# Patient Record
Sex: MALE | NOT HISPANIC OR LATINO | Employment: OTHER | URBAN - METROPOLITAN AREA
[De-identification: names, ages, dates, MRNs, and addresses within clinical notes are randomized per-mention and may not be internally consistent; named-entity substitution may affect disease eponyms.]

---

## 2018-01-31 ENCOUNTER — PREPPED CHART (OUTPATIENT)
Dept: URBAN - METROPOLITAN AREA CLINIC 48 | Facility: CLINIC | Age: 58
End: 2018-01-31

## 2020-09-11 ENCOUNTER — ESTABLISHED COMPREHENSIVE EXAM (OUTPATIENT)
Dept: URBAN - METROPOLITAN AREA CLINIC 48 | Facility: CLINIC | Age: 60
End: 2020-09-11

## 2020-09-11 DIAGNOSIS — E11.9: ICD-10-CM

## 2020-09-11 DIAGNOSIS — Z97.3: ICD-10-CM

## 2020-09-11 DIAGNOSIS — H52.13: ICD-10-CM

## 2020-09-11 PROCEDURE — 92310 CONTACT LENS FITTING OU: CPT

## 2020-09-11 PROCEDURE — 92015 DETERMINE REFRACTIVE STATE: CPT

## 2020-09-11 PROCEDURE — 92250 FUNDUS PHOTOGRAPHY W/I&R: CPT

## 2020-09-11 PROCEDURE — 92014 COMPRE OPH EXAM EST PT 1/>: CPT

## 2020-09-11 ASSESSMENT — TONOMETRY
OS_IOP_MMHG: 15
OD_IOP_MMHG: 16

## 2020-09-11 ASSESSMENT — VISUAL ACUITY
OS_CC: 20/30
OD_CC: 20/30-2
OS_CC: 20/20
OD_CC: 20/25

## 2021-09-14 ENCOUNTER — ESTABLISHED COMPREHENSIVE EXAM (OUTPATIENT)
Dept: URBAN - METROPOLITAN AREA CLINIC 48 | Facility: CLINIC | Age: 61
End: 2021-09-14

## 2021-09-14 DIAGNOSIS — Z97.3: ICD-10-CM

## 2021-09-14 DIAGNOSIS — H52.13: ICD-10-CM

## 2021-09-14 DIAGNOSIS — E11.9: ICD-10-CM

## 2021-09-14 PROCEDURE — MISCOPTOS MISCELLANEOUS, OPTOS

## 2021-09-14 PROCEDURE — 99214 OFFICE O/P EST MOD 30 MIN: CPT

## 2021-09-14 PROCEDURE — 92015 DETERMINE REFRACTIVE STATE: CPT

## 2021-09-14 PROCEDURE — 92310 CONTACT LENS FITTING OU: CPT

## 2021-09-14 ASSESSMENT — VISUAL ACUITY
OS_CC: 20/50
OD_CC: 20/30-2
OD_CC: 20/20
OS_CC: 20/25

## 2021-09-14 ASSESSMENT — TONOMETRY
OS_IOP_MMHG: 12
OD_IOP_MMHG: 15

## 2022-09-15 ENCOUNTER — ESTABLISHED COMPREHENSIVE EXAM (OUTPATIENT)
Dept: URBAN - METROPOLITAN AREA CLINIC 48 | Facility: CLINIC | Age: 62
End: 2022-09-15

## 2022-09-15 DIAGNOSIS — E11.9: ICD-10-CM

## 2022-09-15 DIAGNOSIS — Z97.3: ICD-10-CM

## 2022-09-15 DIAGNOSIS — H52.13: ICD-10-CM

## 2022-09-15 PROCEDURE — 92015 DETERMINE REFRACTIVE STATE: CPT

## 2022-09-15 PROCEDURE — 99214 OFFICE O/P EST MOD 30 MIN: CPT

## 2022-09-15 PROCEDURE — 92310 CONTACT LENS FITTING OU: CPT

## 2022-09-15 PROCEDURE — MISCOPTOS MISCELLANEOUS, OPTOS

## 2022-09-15 ASSESSMENT — VISUAL ACUITY
OS_CC: 20/20-1
OS_CC: J5
OD_CC: J1
OD_CC: 20/25

## 2022-09-15 ASSESSMENT — TONOMETRY
OS_IOP_MMHG: 14
OD_IOP_MMHG: 15

## 2023-09-22 ENCOUNTER — ESTABLISHED COMPREHENSIVE EXAM (OUTPATIENT)
Dept: URBAN - METROPOLITAN AREA CLINIC 56 | Facility: CLINIC | Age: 63
End: 2023-09-22

## 2023-09-22 DIAGNOSIS — H52.13: ICD-10-CM

## 2023-09-22 DIAGNOSIS — E11.9: ICD-10-CM

## 2023-09-22 DIAGNOSIS — Z97.3: ICD-10-CM

## 2023-09-22 PROCEDURE — 99214 OFFICE O/P EST MOD 30 MIN: CPT

## 2023-09-22 PROCEDURE — 92310 CONTACT LENS FITTING OU: CPT

## 2023-09-22 PROCEDURE — 92015 DETERMINE REFRACTIVE STATE: CPT

## 2023-09-22 PROCEDURE — MISCOPTOS MISCELLANEOUS, OPTOS

## 2023-09-22 ASSESSMENT — VISUAL ACUITY
OS_CC: 20/25-2
OU_CC: 20/20
OD_CC: 20/30
OS_CC: 20/100
OD_CC: 20/30-2
OU_CC: 20/30

## 2023-09-22 ASSESSMENT — TONOMETRY
OS_IOP_MMHG: 15
OD_IOP_MMHG: 16

## 2024-02-21 ENCOUNTER — CONSULTATION (OUTPATIENT)
Dept: URBAN - METROPOLITAN AREA CLINIC 48 | Facility: CLINIC | Age: 64
End: 2024-02-21

## 2024-02-21 DIAGNOSIS — H01.026: ICD-10-CM

## 2024-02-21 DIAGNOSIS — H01.023: ICD-10-CM

## 2024-02-21 DIAGNOSIS — D23.112: ICD-10-CM

## 2024-02-21 PROCEDURE — 99213 OFFICE O/P EST LOW 20 MIN: CPT

## 2024-02-21 ASSESSMENT — VISUAL ACUITY
OD_SC: 20/20-1
OS_SC: 20/20-1

## 2024-02-21 ASSESSMENT — TONOMETRY
OD_IOP_MMHG: 13
OS_IOP_MMHG: 12

## 2024-07-25 ENCOUNTER — APPOINTMENT (OUTPATIENT)
Dept: LAB | Facility: HOSPITAL | Age: 64
End: 2024-07-25
Attending: UROLOGY
Payer: COMMERCIAL

## 2024-07-25 ENCOUNTER — TRANSCRIBE ORDERS (OUTPATIENT)
Dept: PREADMISSION TESTING | Facility: HOSPITAL | Age: 64
End: 2024-07-25

## 2024-07-25 DIAGNOSIS — C61 MALIGNANT NEOPLASM OF PROSTATE (CMS/HCC): ICD-10-CM

## 2024-07-25 DIAGNOSIS — Z01.812 ENCOUNTER FOR PREPROCEDURAL LABORATORY EXAMINATION: ICD-10-CM

## 2024-07-25 DIAGNOSIS — Z01.812 ENCOUNTER FOR PREPROCEDURAL LABORATORY EXAMINATION: Primary | ICD-10-CM

## 2024-07-25 LAB
ALBUMIN SERPL-MCNC: 4.5 G/DL (ref 3.5–5.7)
ALP SERPL-CCNC: 43 IU/L (ref 34–125)
ALT SERPL-CCNC: 20 IU/L (ref 7–52)
ANION GAP SERPL CALC-SCNC: 9 MEQ/L (ref 3–15)
AST SERPL-CCNC: 20 IU/L (ref 13–39)
BASOPHILS # BLD: 0.05 K/UL (ref 0.01–0.1)
BASOPHILS NFR BLD: 0.6 %
BILIRUB SERPL-MCNC: 0.7 MG/DL (ref 0.3–1.2)
BLOOD BANK CMNT PATIENT-IMP: NORMAL
BUN SERPL-MCNC: 17 MG/DL (ref 7–25)
CALCIUM SERPL-MCNC: 10.1 MG/DL (ref 8.6–10.3)
CHLORIDE SERPL-SCNC: 98 MEQ/L (ref 98–107)
CO2 SERPL-SCNC: 32 MEQ/L (ref 21–31)
CREAT SERPL-MCNC: 1.1 MG/DL (ref 0.7–1.3)
DIFFERENTIAL METHOD BLD: NORMAL
EGFRCR SERPLBLD CKD-EPI 2021: >60 ML/MIN/1.73M*2
EOSINOPHIL # BLD: 0.22 K/UL (ref 0.04–0.54)
EOSINOPHIL NFR BLD: 2.8 %
ERYTHROCYTE [DISTWIDTH] IN BLOOD BY AUTOMATED COUNT: 11.8 % (ref 11.6–14.4)
GLUCOSE SERPL-MCNC: 131 MG/DL (ref 70–99)
HCT VFR BLD AUTO: 41.4 % (ref 40.1–51)
HGB BLD-MCNC: 14.2 G/DL (ref 13.7–17.5)
IMM GRANULOCYTES # BLD AUTO: 0.01 K/UL (ref 0–0.08)
IMM GRANULOCYTES NFR BLD AUTO: 0.1 %
LYMPHOCYTES # BLD: 2.6 K/UL (ref 1.2–3.5)
LYMPHOCYTES NFR BLD: 32.7 %
MCH RBC QN AUTO: 29.6 PG (ref 28–33.2)
MCHC RBC AUTO-ENTMCNC: 34.3 G/DL (ref 32.2–36.5)
MCV RBC AUTO: 86.4 FL (ref 83–98)
MONOCYTES # BLD: 0.61 K/UL (ref 0.3–1)
MONOCYTES NFR BLD: 7.7 %
NEUTROPHILS # BLD: 4.46 K/UL (ref 1.7–7)
NEUTS SEG NFR BLD: 56.1 %
NRBC BLD-RTO: 0 %
PDW BLD AUTO: 9.8 FL (ref 9.4–12.4)
PLATELET # BLD AUTO: 251 K/UL (ref 150–350)
POTASSIUM SERPL-SCNC: 3.6 MEQ/L (ref 3.5–5.1)
PROT SERPL-MCNC: 7.2 G/DL (ref 6–8.2)
RBC # BLD AUTO: 4.79 M/UL (ref 4.5–5.8)
SODIUM SERPL-SCNC: 139 MEQ/L (ref 136–145)
WBC # BLD AUTO: 7.95 K/UL (ref 3.8–10.5)

## 2024-07-25 PROCEDURE — 86900 BLOOD TYPING SEROLOGIC ABO: CPT

## 2024-07-25 PROCEDURE — 85025 COMPLETE CBC W/AUTO DIFF WBC: CPT

## 2024-07-25 PROCEDURE — 36415 COLL VENOUS BLD VENIPUNCTURE: CPT

## 2024-07-25 PROCEDURE — 86850 RBC ANTIBODY SCREEN: CPT

## 2024-07-25 PROCEDURE — 80053 COMPREHEN METABOLIC PANEL: CPT

## 2024-08-01 ENCOUNTER — PRE-ADMISSION TESTING (OUTPATIENT)
Dept: PREADMISSION TESTING | Age: 64
End: 2024-08-01
Payer: COMMERCIAL

## 2024-08-01 VITALS — BODY MASS INDEX: 32.62 KG/M2 | WEIGHT: 233 LBS | HEIGHT: 71 IN

## 2024-08-01 RX ORDER — AMLODIPINE BESYLATE 10 MG/1
10 TABLET ORAL DAILY
COMMUNITY
Start: 2024-06-12

## 2024-08-01 RX ORDER — EZETIMIBE 10 MG/1
10 TABLET ORAL EVERY EVENING
COMMUNITY

## 2024-08-01 RX ORDER — ORAL SEMAGLUTIDE 14 MG/1
14 TABLET ORAL
COMMUNITY
Start: 2024-01-01

## 2024-08-01 RX ORDER — UBIDECARENONE 100 MG
100 CAPSULE ORAL DAILY
COMMUNITY

## 2024-08-01 RX ORDER — METFORMIN HYDROCHLORIDE 500 MG/1
500 TABLET, EXTENDED RELEASE ORAL 2 TIMES DAILY WITH MEALS
COMMUNITY
Start: 2024-06-12

## 2024-08-01 RX ORDER — CHLORTHALIDONE 25 MG/1
25 TABLET ORAL DAILY
COMMUNITY

## 2024-08-01 RX ORDER — CHOLECALCIFEROL (VITAMIN D3) 25 MCG
25 TABLET ORAL DAILY
COMMUNITY

## 2024-08-01 RX ORDER — ROSUVASTATIN CALCIUM 5 MG/1
5 TABLET, COATED ORAL EVERY EVENING
COMMUNITY

## 2024-08-01 RX ORDER — VALSARTAN 160 MG/1
160 TABLET ORAL DAILY
COMMUNITY
Start: 2024-06-12

## 2024-08-01 NOTE — PRE-PROCEDURE INSTRUCTIONS
[facility]  [address] [place] [address]    1.       Admissions will call you with your arrival time on  August 12, 2024 (day prior to surgery) between 2pm-4pm.  For questions about your arrival time, please call [phone].    2.       On the day of your procedure please report to the [facility] in the [place]. Please arrive through the [place] [facility] Entrance.  If you are parking in the [facility], come to the ground floor of the garage and follow signs to the [facility].  After being screened, please report to either the Outpatient Registration for Pre-Admission Testing or to the Surgery Registration Desk.    3. Please follow the following fasting guidelines:     Follow Dr's instructions-clear liquid diet the day before the procedure  NPO after midnight-can have 6oz of water 2 hours before arrival time    4. Please take ONLY the following medications with a sip of water on the morning of your procedure:    Amlodipine with small sip of water  STOP supplements, NSAIDS one week before surgery  PER Family Rocky Saunders a week before surgery    5. Other Instructions: You may brush your teeth the morning of the procedure. Rinse and spit, do not swallow.  Bring a list of your medications with dosages.  Use surgical wash as directed.     6. If you develop a cold, cough, fever, rash, or other symptom prior to the day of the procedure, please report it to your physician immediately.    7. If you need to cancel the procedure for any reason, please contact your physician.    8. Make arrangements to have safe transportation home accompanied by a responsible adult. If you have not arranged safe transportation home, your surgery will be cancelled. Safe transportation may include private vehicle, ride-share service, taxi and public transportation when accompanied by a responsible adult who will assist you home. A responsible adult is someone known to you and does not include  the taxi, ride-share or public transit drive transporting you.    9.  If it is medically necessary for you to have a longer stay, you will be informed as soon as the decision is made.    10. Only bring essential items to the hospital.  Do not wear or bring anything of value to the hospital including jewelry of any kind, money, or wallet. Do not wear make-up or contact lenses.  DO NOT BRING MEDICATIONS FROM HOME unless instructed to do so. DO bring your hearing aids, glasses, and a case    11. No lotion, creams, powders, or oils on skin the morning of procedure     12. Dress in comfortable clothes.    13.  If instructed, please bring a copy of your Advanced Directive (Living Will/Durable Power of ) on the day of your procedure.     14. Ensuring your safety at all times is a very important part of our [facility] Culture of Safety. After having surgery and sedation, you are at risk for falling and balance issues. Although you may feel awake, the effects of the medication can last up to 24 hours after anesthesia. If you need to use the bathroom during your recovery period, nursing staff will escort you there and stay with you to ensure your safety.    15. Refrain from drinking alcohol and smoking cigarettes for 24 hours prior to surgery.    16. Shower with antibacterial soap (Dial) the night before and morning of your procedure.  If your procedure indicates the need for CHG antiseptic wash (Bactoshield or Hibiclens), please use this instead and follow instructions as discussed at the time of your Pre-Admission Testing phone interview or visit.    [facility]  Patient Education Preoperative Showers    Good hygiene, such as frequent handwashing and daily skin cleansing, promotes good health. Daily skin cleansing helps remove germs that may cause infections. The following instructions should be followed to help reduce germs on your skin prior to your surgical procedure.     Bactoshield/Hibiclens CHG 4% is an  antiseptic soap. The active ingredient is chlorhexidine gluconate. Do not use this product if you are allergic to chlorhexidine gluconate.  The NIGHT before and the MORNING of your procedure, shower or bathe with Bactoshield. This product should replace your regular soap used for cleansing most of your body surfaces. Bactoshield should not be used on your head or face; keep out of your eyes, ears and mouth.  If you plan to wash your hair, do so with your regular shampoo. Then, rinse the hair and your body thoroughly to remove any shampoo residue.  Wash your face with regular soap and water only.  Wash your genital area with soap and water only.  Thoroughly rinse your body with warm water from the neck down.  Apply the minimum amount of Bactoshield to cover the skin. Use this product as you would any liquid soap. Leave this on for 2 minutes. NOTE- Bactoshield DOES NOT LATHER like normal soap.   Wash the skin gently and rinse thoroughly with warm water. You do not need to scrub the skin to remove germs.  Do not use regular soap after you have applied and rinsed the Bactoshield.  Change into clean clothes after each shower.   Do not apply any lotions, powders, or perfumes to the body areas that have been cleansed with Bactoshield.  No use of hair removal products or shaving at or near the surgical site 48 hours before your procedure. (72 hours for cardiac patients.)  For those having perineal surgeries (i.e.: vaginal, rectal or cystoscopy) - please use Dial soap    Above instructions reviewed with patient and patient acknowledges understanding.    Form explained by: Rafaela Forbes RN

## 2024-08-12 ENCOUNTER — ANESTHESIA EVENT (OUTPATIENT)
Dept: OPERATING ROOM | Facility: HOSPITAL | Age: 64
Setting detail: SURGERY ADMIT
DRG: 708 | End: 2024-08-12
Payer: COMMERCIAL

## 2024-08-12 LAB
ABO + RH BLD: NORMAL
BLD GP AB SCN SERPL QL: NEGATIVE
D AG BLD QL: POSITIVE
LABORATORY COMMENT REPORT: NORMAL
LABORATORY COMMENT REPORT: NORMAL
SPECIMEN EXP DATE BLD: NORMAL

## 2024-08-13 ENCOUNTER — HOSPITAL ENCOUNTER (INPATIENT)
Facility: HOSPITAL | Age: 64
LOS: 1 days | Discharge: HOME | DRG: 708 | End: 2024-08-14
Attending: UROLOGY | Admitting: UROLOGY
Payer: COMMERCIAL

## 2024-08-13 ENCOUNTER — ANESTHESIA (OUTPATIENT)
Dept: OPERATING ROOM | Facility: HOSPITAL | Age: 64
Setting detail: SURGERY ADMIT
DRG: 708 | End: 2024-08-13
Payer: COMMERCIAL

## 2024-08-13 DIAGNOSIS — C61 PROSTATE CA (CMS/HCC): ICD-10-CM

## 2024-08-13 LAB
ABO + RH BLD: NORMAL
D AG BLD QL: POSITIVE
GLUCOSE BLD-MCNC: 122 MG/DL (ref 70–99)
GLUCOSE BLD-MCNC: 139 MG/DL (ref 70–99)
GLUCOSE BLD-MCNC: 140 MG/DL (ref 70–99)
GLUCOSE BLD-MCNC: 188 MG/DL (ref 70–99)
LABORATORY COMMENT REPORT: NORMAL
POCT TEST: ABNORMAL

## 2024-08-13 PROCEDURE — 63700000 HC SELF-ADMINISTRABLE DRUG: Performed by: UROLOGY

## 2024-08-13 PROCEDURE — 37000001 HC ANESTHESIA GENERAL: Performed by: UROLOGY

## 2024-08-13 PROCEDURE — 0VT04ZZ RESECTION OF PROSTATE, PERCUTANEOUS ENDOSCOPIC APPROACH: ICD-10-PCS | Performed by: UROLOGY

## 2024-08-13 PROCEDURE — 25000000 HC PHARMACY GENERAL: Performed by: NURSE ANESTHETIST, CERTIFIED REGISTERED

## 2024-08-13 PROCEDURE — 88309 TISSUE EXAM BY PATHOLOGIST: CPT | Performed by: UROLOGY

## 2024-08-13 PROCEDURE — 63600000 HC DRUGS/DETAIL CODE: Mod: JZ

## 2024-08-13 PROCEDURE — 88307 TISSUE EXAM BY PATHOLOGIST: CPT | Performed by: UROLOGY

## 2024-08-13 PROCEDURE — 63600000 HC DRUGS/DETAIL CODE: Mod: JZ | Performed by: NURSE ANESTHETIST, CERTIFIED REGISTERED

## 2024-08-13 PROCEDURE — 12000000 HC ROOM AND CARE MED/SURG

## 2024-08-13 PROCEDURE — 63600000 HC DRUGS/DETAIL CODE: Performed by: NURSE ANESTHETIST, CERTIFIED REGISTERED

## 2024-08-13 PROCEDURE — 36415 COLL VENOUS BLD VENIPUNCTURE: CPT | Performed by: UROLOGY

## 2024-08-13 PROCEDURE — 71000001 HC PACU PHASE 1 INITIAL 30MIN: Performed by: UROLOGY

## 2024-08-13 PROCEDURE — C1894 INTRO/SHEATH, NON-LASER: HCPCS | Performed by: UROLOGY

## 2024-08-13 PROCEDURE — 8E0W4CZ ROBOTIC ASSISTED PROCEDURE OF TRUNK REGION, PERCUTANEOUS ENDOSCOPIC APPROACH: ICD-10-PCS | Performed by: UROLOGY

## 2024-08-13 PROCEDURE — 63600000 HC DRUGS/DETAIL CODE: Mod: JZ,JG | Performed by: UROLOGY

## 2024-08-13 PROCEDURE — 0VT34ZZ RESECTION OF BILATERAL SEMINAL VESICLES, PERCUTANEOUS ENDOSCOPIC APPROACH: ICD-10-PCS | Performed by: UROLOGY

## 2024-08-13 PROCEDURE — 07BC4ZX EXCISION OF PELVIS LYMPHATIC, PERCUTANEOUS ENDOSCOPIC APPROACH, DIAGNOSTIC: ICD-10-PCS | Performed by: UROLOGY

## 2024-08-13 PROCEDURE — 25800000 HC PHARMACY IV SOLUTIONS: Performed by: NURSE ANESTHETIST, CERTIFIED REGISTERED

## 2024-08-13 PROCEDURE — 0VTQ4ZZ RESECTION OF BILATERAL VAS DEFERENS, PERCUTANEOUS ENDOSCOPIC APPROACH: ICD-10-PCS | Performed by: UROLOGY

## 2024-08-13 PROCEDURE — 36000006 HC OR LEVEL 6 INITIAL 30MIN: Performed by: UROLOGY

## 2024-08-13 PROCEDURE — 27200000 HC STERILE SUPPLY: Performed by: UROLOGY

## 2024-08-13 PROCEDURE — 63600000 HC DRUGS/DETAIL CODE: Mod: JZ | Performed by: UROLOGY

## 2024-08-13 PROCEDURE — 71000011 HC PACU PHASE 1 EA ADDL MIN: Performed by: UROLOGY

## 2024-08-13 PROCEDURE — 36000016 HC OR LEVEL 6 EA ADDL MIN: Performed by: UROLOGY

## 2024-08-13 PROCEDURE — 25800000 HC PHARMACY IV SOLUTIONS: Performed by: UROLOGY

## 2024-08-13 RX ORDER — DEXTROSE 50 % IN WATER (D50W) INTRAVENOUS SYRINGE
25 AS NEEDED
Status: DISCONTINUED | OUTPATIENT
Start: 2024-08-13 | End: 2024-08-13 | Stop reason: HOSPADM

## 2024-08-13 RX ORDER — CHLORTHALIDONE 25 MG/1
25 TABLET ORAL DAILY
Status: DISCONTINUED | OUTPATIENT
Start: 2024-08-13 | End: 2024-08-14 | Stop reason: HOSPADM

## 2024-08-13 RX ORDER — SODIUM CHLORIDE, SODIUM GLUCONATE, SODIUM ACETATE, POTASSIUM CHLORIDE AND MAGNESIUM CHLORIDE 30; 37; 368; 526; 502 MG/100ML; MG/100ML; MG/100ML; MG/100ML; MG/100ML
INJECTION, SOLUTION INTRAVENOUS CONTINUOUS PRN
Status: DISCONTINUED | OUTPATIENT
Start: 2024-08-13 | End: 2024-08-13 | Stop reason: SURG

## 2024-08-13 RX ORDER — CIPROFLOXACIN 500 MG/1
500 TABLET ORAL DAILY
Status: DISCONTINUED | OUTPATIENT
Start: 2024-08-14 | End: 2024-08-14 | Stop reason: HOSPADM

## 2024-08-13 RX ORDER — KETOROLAC TROMETHAMINE 30 MG/ML
INJECTION, SOLUTION INTRAMUSCULAR; INTRAVENOUS AS NEEDED
Status: DISCONTINUED | OUTPATIENT
Start: 2024-08-13 | End: 2024-08-13 | Stop reason: SURG

## 2024-08-13 RX ORDER — ONDANSETRON HYDROCHLORIDE 2 MG/ML
4 INJECTION, SOLUTION INTRAVENOUS EVERY 8 HOURS PRN
Status: DISCONTINUED | OUTPATIENT
Start: 2024-08-13 | End: 2024-08-14 | Stop reason: HOSPADM

## 2024-08-13 RX ORDER — HYDROMORPHONE HYDROCHLORIDE 1 MG/ML
0.25 INJECTION, SOLUTION INTRAMUSCULAR; INTRAVENOUS; SUBCUTANEOUS EVERY 4 HOURS PRN
Status: DISCONTINUED | OUTPATIENT
Start: 2024-08-13 | End: 2024-08-14 | Stop reason: HOSPADM

## 2024-08-13 RX ORDER — SODIUM CHLORIDE 9 MG/ML
INJECTION, SOLUTION INTRAVENOUS CONTINUOUS
Status: DISCONTINUED | OUTPATIENT
Start: 2024-08-13 | End: 2024-08-14 | Stop reason: HOSPADM

## 2024-08-13 RX ORDER — LABETALOL HCL 20 MG/4 ML
5 SYRINGE (ML) INTRAVENOUS AS NEEDED
Status: DISCONTINUED | OUTPATIENT
Start: 2024-08-13 | End: 2024-08-13 | Stop reason: HOSPADM

## 2024-08-13 RX ORDER — DEXTROSE 40 %
15-30 GEL (GRAM) ORAL AS NEEDED
Status: DISCONTINUED | OUTPATIENT
Start: 2024-08-13 | End: 2024-08-13 | Stop reason: HOSPADM

## 2024-08-13 RX ORDER — INSULIN LISPRO 100 [IU]/ML
6-10 INJECTION, SOLUTION INTRAVENOUS; SUBCUTANEOUS
Status: DISCONTINUED | OUTPATIENT
Start: 2024-08-13 | End: 2024-08-14 | Stop reason: HOSPADM

## 2024-08-13 RX ORDER — FENTANYL CITRATE 50 UG/ML
50 INJECTION, SOLUTION INTRAMUSCULAR; INTRAVENOUS EVERY 5 MIN PRN
Status: DISCONTINUED | OUTPATIENT
Start: 2024-08-13 | End: 2024-08-13 | Stop reason: HOSPADM

## 2024-08-13 RX ORDER — DEXTROSE 50 % IN WATER (D50W) INTRAVENOUS SYRINGE
25 AS NEEDED
Status: DISCONTINUED | OUTPATIENT
Start: 2024-08-13 | End: 2024-08-13

## 2024-08-13 RX ORDER — IBUPROFEN 200 MG
16-32 TABLET ORAL AS NEEDED
Status: DISCONTINUED | OUTPATIENT
Start: 2024-08-13 | End: 2024-08-14 | Stop reason: HOSPADM

## 2024-08-13 RX ORDER — ROSUVASTATIN CALCIUM 5 MG/1
5 TABLET, COATED ORAL EVERY EVENING
Status: DISCONTINUED | OUTPATIENT
Start: 2024-08-13 | End: 2024-08-14 | Stop reason: HOSPADM

## 2024-08-13 RX ORDER — DEXTROSE 40 %
15-30 GEL (GRAM) ORAL AS NEEDED
Status: DISCONTINUED | OUTPATIENT
Start: 2024-08-13 | End: 2024-08-13

## 2024-08-13 RX ORDER — IBUPROFEN 200 MG
16-32 TABLET ORAL AS NEEDED
Status: DISCONTINUED | OUTPATIENT
Start: 2024-08-13 | End: 2024-08-13 | Stop reason: HOSPADM

## 2024-08-13 RX ORDER — LIDOCAINE HYDROCHLORIDE 10 MG/ML
INJECTION, SOLUTION INFILTRATION; PERINEURAL AS NEEDED
Status: DISCONTINUED | OUTPATIENT
Start: 2024-08-13 | End: 2024-08-13 | Stop reason: SURG

## 2024-08-13 RX ORDER — FENTANYL CITRATE 50 UG/ML
INJECTION, SOLUTION INTRAMUSCULAR; INTRAVENOUS AS NEEDED
Status: DISCONTINUED | OUTPATIENT
Start: 2024-08-13 | End: 2024-08-13 | Stop reason: SURG

## 2024-08-13 RX ORDER — MIDAZOLAM HYDROCHLORIDE 2 MG/2ML
INJECTION, SOLUTION INTRAMUSCULAR; INTRAVENOUS AS NEEDED
Status: DISCONTINUED | OUTPATIENT
Start: 2024-08-13 | End: 2024-08-13 | Stop reason: SURG

## 2024-08-13 RX ORDER — AMLODIPINE BESYLATE 10 MG/1
10 TABLET ORAL DAILY
Status: DISCONTINUED | OUTPATIENT
Start: 2024-08-14 | End: 2024-08-14 | Stop reason: HOSPADM

## 2024-08-13 RX ORDER — ENOXAPARIN SODIUM 100 MG/ML
40 INJECTION SUBCUTANEOUS
Status: COMPLETED | OUTPATIENT
Start: 2024-08-13 | End: 2024-08-13

## 2024-08-13 RX ORDER — ONDANSETRON HYDROCHLORIDE 2 MG/ML
4 INJECTION, SOLUTION INTRAVENOUS
Status: DISCONTINUED | OUTPATIENT
Start: 2024-08-13 | End: 2024-08-13 | Stop reason: HOSPADM

## 2024-08-13 RX ORDER — EZETIMIBE 10 MG/1
10 TABLET ORAL EVERY EVENING
Status: DISCONTINUED | OUTPATIENT
Start: 2024-08-13 | End: 2024-08-14 | Stop reason: HOSPADM

## 2024-08-13 RX ORDER — ENOXAPARIN SODIUM 100 MG/ML
INJECTION SUBCUTANEOUS
Status: COMPLETED
Start: 2024-08-13 | End: 2024-08-13

## 2024-08-13 RX ORDER — HYDROMORPHONE HYDROCHLORIDE 1 MG/ML
0.5 INJECTION, SOLUTION INTRAMUSCULAR; INTRAVENOUS; SUBCUTANEOUS
Status: DISCONTINUED | OUTPATIENT
Start: 2024-08-13 | End: 2024-08-13 | Stop reason: HOSPADM

## 2024-08-13 RX ORDER — ROCURONIUM BROMIDE 10 MG/ML
INJECTION, SOLUTION INTRAVENOUS AS NEEDED
Status: DISCONTINUED | OUTPATIENT
Start: 2024-08-13 | End: 2024-08-13 | Stop reason: SURG

## 2024-08-13 RX ORDER — ONDANSETRON HYDROCHLORIDE 2 MG/ML
INJECTION, SOLUTION INTRAVENOUS AS NEEDED
Status: DISCONTINUED | OUTPATIENT
Start: 2024-08-13 | End: 2024-08-13 | Stop reason: SURG

## 2024-08-13 RX ORDER — POLYETHYLENE GLYCOL 3350 17 G/17G
17 POWDER, FOR SOLUTION ORAL DAILY
Status: DISCONTINUED | OUTPATIENT
Start: 2024-08-13 | End: 2024-08-14 | Stop reason: HOSPADM

## 2024-08-13 RX ORDER — HYDROMORPHONE HYDROCHLORIDE 1 MG/ML
INJECTION, SOLUTION INTRAMUSCULAR; INTRAVENOUS; SUBCUTANEOUS AS NEEDED
Status: DISCONTINUED | OUTPATIENT
Start: 2024-08-13 | End: 2024-08-13 | Stop reason: SURG

## 2024-08-13 RX ORDER — ACETAMINOPHEN 325 MG/1
650 TABLET ORAL EVERY 4 HOURS
Status: DISCONTINUED | OUTPATIENT
Start: 2024-08-13 | End: 2024-08-14 | Stop reason: HOSPADM

## 2024-08-13 RX ORDER — BUPIVACAINE HYDROCHLORIDE 5 MG/ML
INJECTION, SOLUTION EPIDURAL; INTRACAUDAL
Status: DISCONTINUED | OUTPATIENT
Start: 2024-08-13 | End: 2024-08-13 | Stop reason: HOSPADM

## 2024-08-13 RX ORDER — DEXTROSE 50 % IN WATER (D50W) INTRAVENOUS SYRINGE
25 AS NEEDED
Status: DISCONTINUED | OUTPATIENT
Start: 2024-08-13 | End: 2024-08-14 | Stop reason: HOSPADM

## 2024-08-13 RX ORDER — DEXAMETHASONE SODIUM PHOSPHATE 4 MG/ML
INJECTION, SOLUTION INTRA-ARTICULAR; INTRALESIONAL; INTRAMUSCULAR; INTRAVENOUS; SOFT TISSUE AS NEEDED
Status: DISCONTINUED | OUTPATIENT
Start: 2024-08-13 | End: 2024-08-13 | Stop reason: SURG

## 2024-08-13 RX ORDER — SODIUM CHLORIDE 9 MG/ML
INJECTION, SOLUTION INTRAVENOUS CONTINUOUS PRN
Status: DISCONTINUED | OUTPATIENT
Start: 2024-08-13 | End: 2024-08-13 | Stop reason: SURG

## 2024-08-13 RX ORDER — PROPOFOL 10 MG/ML
INJECTION, EMULSION INTRAVENOUS AS NEEDED
Status: DISCONTINUED | OUTPATIENT
Start: 2024-08-13 | End: 2024-08-13 | Stop reason: SURG

## 2024-08-13 RX ORDER — OXYCODONE HYDROCHLORIDE 5 MG/1
5 TABLET ORAL EVERY 4 HOURS PRN
Status: DISCONTINUED | OUTPATIENT
Start: 2024-08-13 | End: 2024-08-14 | Stop reason: HOSPADM

## 2024-08-13 RX ORDER — HEPARIN SODIUM 5000 [USP'U]/ML
5000 INJECTION, SOLUTION INTRAVENOUS; SUBCUTANEOUS EVERY 8 HOURS
Status: DISCONTINUED | OUTPATIENT
Start: 2024-08-14 | End: 2024-08-14 | Stop reason: HOSPADM

## 2024-08-13 RX ORDER — KETOROLAC TROMETHAMINE 15 MG/ML
15 INJECTION, SOLUTION INTRAMUSCULAR; INTRAVENOUS EVERY 6 HOURS
Status: DISCONTINUED | OUTPATIENT
Start: 2024-08-13 | End: 2024-08-13

## 2024-08-13 RX ORDER — DEXTROSE 40 %
15-30 GEL (GRAM) ORAL AS NEEDED
Status: DISCONTINUED | OUTPATIENT
Start: 2024-08-13 | End: 2024-08-14 | Stop reason: HOSPADM

## 2024-08-13 RX ORDER — IBUPROFEN 200 MG
16-32 TABLET ORAL AS NEEDED
Status: DISCONTINUED | OUTPATIENT
Start: 2024-08-13 | End: 2024-08-13

## 2024-08-13 RX ORDER — KETOROLAC TROMETHAMINE 15 MG/ML
15 INJECTION, SOLUTION INTRAMUSCULAR; INTRAVENOUS EVERY 6 HOURS
Status: DISCONTINUED | OUTPATIENT
Start: 2024-08-13 | End: 2024-08-14 | Stop reason: HOSPADM

## 2024-08-13 RX ADMIN — ACETAMINOPHEN 650 MG: 325 TABLET ORAL at 21:42

## 2024-08-13 RX ADMIN — PROPOFOL 10 MCG/KG/MIN: 10 INJECTION, EMULSION INTRAVENOUS at 07:42

## 2024-08-13 RX ADMIN — CEFAZOLIN 2 G: 2 INJECTION, POWDER, FOR SOLUTION INTRAMUSCULAR; INTRAVENOUS at 19:50

## 2024-08-13 RX ADMIN — CEFAZOLIN 2 G: 2 INJECTION, POWDER, FOR SOLUTION INTRAMUSCULAR; INTRAVENOUS at 07:36

## 2024-08-13 RX ADMIN — PROPOFOL 160 MG: 10 INJECTION, EMULSION INTRAVENOUS at 07:33

## 2024-08-13 RX ADMIN — OXYCODONE HYDROCHLORIDE 5 MG: 5 TABLET ORAL at 20:01

## 2024-08-13 RX ADMIN — CHLORTHALIDONE 25 MG: 25 TABLET ORAL at 14:59

## 2024-08-13 RX ADMIN — PROPOFOL 40 MG: 10 INJECTION, EMULSION INTRAVENOUS at 07:51

## 2024-08-13 RX ADMIN — ROCURONIUM BROMIDE 20 MG: 10 INJECTION, SOLUTION INTRAVENOUS at 10:54

## 2024-08-13 RX ADMIN — FENTANYL CITRATE 50 MCG: 50 INJECTION INTRAMUSCULAR; INTRAVENOUS at 09:52

## 2024-08-13 RX ADMIN — SODIUM CHLORIDE: 0.9 INJECTION, SOLUTION INTRAVENOUS at 07:25

## 2024-08-13 RX ADMIN — ROCURONIUM BROMIDE 30 MG: 10 INJECTION, SOLUTION INTRAVENOUS at 09:23

## 2024-08-13 RX ADMIN — EZETIMIBE 10 MG: 10 TABLET ORAL at 16:39

## 2024-08-13 RX ADMIN — SODIUM CHLORIDE, SODIUM GLUCONATE, SODIUM ACETATE, POTASSIUM CHLORIDE AND MAGNESIUM CHLORIDE: 526; 502; 368; 37; 30 INJECTION, SOLUTION INTRAVENOUS at 07:37

## 2024-08-13 RX ADMIN — SUGAMMADEX 200 MG: 100 INJECTION, SOLUTION INTRAVENOUS at 11:32

## 2024-08-13 RX ADMIN — CEFAZOLIN 2 G: 2 INJECTION, POWDER, FOR SOLUTION INTRAMUSCULAR; INTRAVENOUS at 11:36

## 2024-08-13 RX ADMIN — KETOROLAC TROMETHAMINE 15 MG: 15 INJECTION, SOLUTION INTRAMUSCULAR; INTRAVENOUS at 17:08

## 2024-08-13 RX ADMIN — SODIUM CHLORIDE: 9 INJECTION, SOLUTION INTRAVENOUS at 17:11

## 2024-08-13 RX ADMIN — HYDROMORPHONE HYDROCHLORIDE 0.5 MG: 1 INJECTION, SOLUTION INTRAMUSCULAR; INTRAVENOUS; SUBCUTANEOUS at 11:35

## 2024-08-13 RX ADMIN — SODIUM CHLORIDE: 9 INJECTION, SOLUTION INTRAVENOUS at 14:29

## 2024-08-13 RX ADMIN — SODIUM CHLORIDE, SODIUM GLUCONATE, SODIUM ACETATE, POTASSIUM CHLORIDE AND MAGNESIUM CHLORIDE: 526; 502; 368; 37; 30 INJECTION, SOLUTION INTRAVENOUS at 11:03

## 2024-08-13 RX ADMIN — LIDOCAINE HYDROCHLORIDE 10 ML: 10 INJECTION, SOLUTION INFILTRATION; PERINEURAL at 07:33

## 2024-08-13 RX ADMIN — ENOXAPARIN SODIUM 40 MG: 40 INJECTION SUBCUTANEOUS at 06:47

## 2024-08-13 RX ADMIN — ONDANSETRON 4 MG: 2 INJECTION INTRAMUSCULAR; INTRAVENOUS at 07:52

## 2024-08-13 RX ADMIN — MIDAZOLAM HYDROCHLORIDE 2 MG: 1 INJECTION, SOLUTION INTRAMUSCULAR; INTRAVENOUS at 07:23

## 2024-08-13 RX ADMIN — DEXAMETHASONE SODIUM PHOSPHATE 8 MG: 4 INJECTION, SOLUTION INTRA-ARTICULAR; INTRALESIONAL; INTRAMUSCULAR; INTRAVENOUS; SOFT TISSUE at 07:52

## 2024-08-13 RX ADMIN — KETOROLAC TROMETHAMINE 15 MG: 30 INJECTION, SOLUTION INTRAMUSCULAR at 11:33

## 2024-08-13 RX ADMIN — ENOXAPARIN SODIUM 40 MG: 100 INJECTION SUBCUTANEOUS at 06:47

## 2024-08-13 RX ADMIN — ROSUVASTATIN CALCIUM 5 MG: 5 TABLET, FILM COATED ORAL at 17:07

## 2024-08-13 RX ADMIN — FENTANYL CITRATE 100 MCG: 50 INJECTION INTRAMUSCULAR; INTRAVENOUS at 07:33

## 2024-08-13 RX ADMIN — ACETAMINOPHEN 650 MG: 325 TABLET ORAL at 16:40

## 2024-08-13 RX ADMIN — ROCURONIUM BROMIDE 100 MG: 10 INJECTION, SOLUTION INTRAVENOUS at 07:33

## 2024-08-13 RX ADMIN — FENTANYL CITRATE 50 MCG: 50 INJECTION INTRAMUSCULAR; INTRAVENOUS at 09:24

## 2024-08-13 ASSESSMENT — COGNITIVE AND FUNCTIONAL STATUS - GENERAL
WALKING IN HOSPITAL ROOM: 3 - A LITTLE
MOVING TO AND FROM BED TO CHAIR: 3 - A LITTLE
CLIMB 3 TO 5 STEPS WITH RAILING: 3 - A LITTLE
STANDING UP FROM CHAIR USING ARMS: 3 - A LITTLE
STANDING UP FROM CHAIR USING ARMS: 3 - A LITTLE
WALKING IN HOSPITAL ROOM: 3 - A LITTLE
CLIMB 3 TO 5 STEPS WITH RAILING: 3 - A LITTLE
MOVING TO AND FROM BED TO CHAIR: 3 - A LITTLE

## 2024-08-13 NOTE — ANESTHESIA PREPROCEDURE EVALUATION
Relevant Problems   No relevant active problems       ROS/Med Hx     Past Surgical History:   Procedure Laterality Date    COLONOSCOPY      FRACTURE SURGERY Left     left foream maggi in place    PROSTATE BIOPSY       There is no problem list on file for this patient.      No current facility-administered medications for this encounter.       Prior to Admission medications    Medication Sig Start Date End Date Taking? Authorizing Provider   amLODIPine (NORVASC) 10 mg tablet Take 10 mg by mouth daily. 6/12/24   Jordan Cotton MD   chlorthalidone (HYGROTON) 25 mg tablet Take 25 mg by mouth daily.    Jordan Cotton MD   cholecalciferol, vitamin D3, 1,000 unit (25 mcg) tablet Take 25 mcg by mouth daily.    Jordan Cotton MD   coenzyme Q10 (COQ10) 100 mg capsule Take 100 mg by mouth daily.    Jordan Cotton MD   ezetimibe (ZETIA) 10 mg tablet Take 10 mg by mouth every evening.    Jordan Cotton MD   FISH OIL-omega-3 fatty acids (FISH OIL) 340-1,000 mg capsule Take 1 capsule by mouth daily.    Jordan Cotton MD   metFORMIN XR (GLUCOPHAGE-XR) 500 mg 24 hr tablet Take 500 mg by mouth 2 (two) times a day with meals. 6/12/24   Jordan Cotton MD   rosuvastatin (CRESTOR) 5 mg tablet Take 5 mg by mouth every evening.    Jordan Cotton MD   RYBELSUS 14 mg tablet Take 14 mg by mouth daily before breakfast. 1/1/24   Jordan Cotton MD   valsartan (DIOVAN) 160 mg tablet Take 160 mg by mouth daily. 6/12/24   Jordan Cotton MD       CBC Results         07/25/24     1131    WBC 7.95    RBC 4.79    HGB 14.2    HCT 41.4    MCV 86.4    MCH 29.6    MCHC 34.3                BMP Results         07/25/24     1131        K 3.6    Cl 98    CO2 32    Glucose 131    BUN 17    Creatinine 1.1    Calcium 10.1    Anion Gap 9    EGFR >60.0           Comment for EGFR at 1131 on 07/25/24: Calculation based on the Chronic Kidney Disease  "Epidemiology Collaboration (CKD-EPI) equation refit without adjustment for race.            No results found for: \"HCGPREGUR\", \"PREGSERUM\", \"HCG\", \"HCGQUANT\"          No orders to display         Physical Exam    Airway   Mallampati: III  Cardiovascular - normal   Rhythm: regular   Rate: normalPulmonary - normal   clear to auscultation  Dental - normal        Anesthesia Plan    Plan: general    Technique: general endotracheal     Lines and Monitors: PIV     Airway: oral intubation    2 ASA  Anesthetic plan and risks discussed with: patient  Induction:    intravenous   Postop Plan:   Patient Disposition: inpatient floor planned admission   Pain Management: IV analgesics      "

## 2024-08-13 NOTE — OR SURGEON
Pre-Procedure patient identification:  I am the primary operating surgeon/proceduralist and I have reviewed the applicable pathology reports and radiology studies for this procedure. I have identified the patient on 08/13/24 at 7:19 AM Twin Singer MD  Phone Number: [phone]

## 2024-08-13 NOTE — OP NOTE
REPORT TYPE:  Operative Note     DATE OF OPERATION: 8/13/24      PREOPERATIVE DIAGNOSIS:  Prostate cancer.     PROCEDURE PERFORMED:  1.  Laparoscopic robotically assisted radical prostatectomy.  2.  Laparoscopic robotically assisted bilateral pelvic lymph node dissection.     POSTOPERATIVE DIAGNOSIS:  Prostate cancer.     SURGEON:  Twin Singer MD.     ASSISTANTS:  Stacy Ramos and Uday    ANESTHESIA:  General endotracheal.     ESTIMATED BLOOD LOSS: 200cc     URINE OUTPUT:  Not estimated.     SPECIMENS:  Prostate and seminal vesicles for routine pathology and bilateral pelvic lymph nodes for routine pathology.     COMPLICATIONS:  None.     DISPOSITION:  The patient transferred to the PACU awake and in excellent condition.     INDICATIONS FOR PROCEDURE:  The patient is a 63-year-old male with elevated PSA of 7.67 ng/mL who underwent a prostate biopsy in May 2024 and was found to have grade group 2 and 4 carcinoma of the prostate.     MRI Prostate (2/26/24):   No discreet lesions.   Prostate volume : 58cc     Prostate Biopsy (5/8/24):     M. Lesion 1 right mid peripheral zone posterior lateral,   Needle Biopsy   Prostatic adenocarcinoma; Kai Score 4+4=8   (Grade Group 4); 1 of 7 cores involved.   Tumor measures 0.12 cm in length; 3% tissue area   involved by tumor.   J. Right Lateral Base, Needle Biopsy   Prostatic adenocarcinoma; Kai Score 3+4=7   (Grade Group 2); 1 of 1 core involved.   Tumor measures 0.08 cm in length; 5% tissue area   involved by tumor     PSMA PET/CT (6/7/24):     No evidence of PET+ lymphadenopathy or metastatic disease.   Activity at the base of the tongue and cervical LNs requiring ENT evaluation. The initial ENT evaluation was negative, but the patient is scheduled to see another otolaryngologist on July 1 for second opinion.     His clinical stage is T1 cN0 M0, stage IIc and the patient has high risk prostate cancer.        All the risks, benefits and possible complications of the  surgery were discussed with the patient in detail.  He understood and wanted to proceed and signed the informed consent form.     On the day of procedure, the patient was identified correctly in the holding area.  He was given intravenous antibiotics with Ancef and sequential compression devices were placed on both lower extremities and were operational.     DESCRIPTION OF PROCEDURE:  The patient was taken to the operating room and placed on the table in supine position.  Following administration of general anesthesia, the patient was intubated and then he was positioned on the table with his pressure points   carefully padded.  His arms were tucked and he was secured to the table with a safety strap across his chest and thighs.  Once that was completed, the patient's abdomen and genitalia were prepped with ChloraPrep and he was sterilely draped.  Next, a   timeout was carried out correctly.  Next, a 20-Filipino Reyes catheter was placed sterilely into the bladder and was attached to gravity drainage.  Once that was done, his abdomen was insufflated using a Veress needle.  Once intra-abdominal pressure   reached 50 mmHg, an 8 mm da Kailyn trocar was placed just above the umbilicus into the peritoneal cavity and then under laparoscopic vision, we placed additional laparoscopic trocars.  First on in the right lower quadrant, we placed a two 8 mm da Kailyn   trocars and then in the left lower quadrant, an 8 mm da Kailyn trocar and a 12 mm trocar laterally.  Next, we placed a 5 mm trocar in the left upper quadrant.  The skin of the laparoscopic incisions was infiltrated with Marcaine prior to placement   of the trocars.  Once these were placed, the table was placed into a Trendelenburg position.  Next a 16 Filipino suprapubic catheter was placed into the bladder using a provided percutaneous trocar system.  10 cc of sterile water were placed in the balloon and the suprapubic tube was secured to the skin with 2-0 nylon suture.   Next the robotic system was brought to the table and docked to the trocars, and the working instruments were placed.  At that point, I unscrubbed to   perform the procedure from the console.     Next, the peritoneum of the posterior cul-de-sac was incised.  Next, the right vas deferens was   identified, dissected, then transected with cautery, and then the right seminal vesicle was identified.  Next, we controlled the blood vessels supplying the seminal vesicle with small clips and continued our dissection until the right seminal vesicle was   completely mobilized.  Next, the left vas deferens was identified, again dissected then transected, and the left seminal vesicle was elevated, then the vessels supplying seminal vesicle were again controlled with clips and then transected until the left   seminal vesicle was freed up circumferentially.  Next, the anterior leaf of the incised peritoneum of the posterior cul-de-sac was elevated using a suture on  the Patrick needle passed through the anterior abdominal wall on each side.  At that point,   the Denonvilliers fascia was incised and the posterior plane was developed beneath the prostate all the way to the prostatic apex.  Next, we started neurovascular bundle preservation on the left side and carefully took the bundle off the posterolateral surface of the prostate without any cautery.  We then controlled the prostatic pedicles bilaterally using clips.  At the completion of the dissection, left neurovascular bundle preservation was achieved.  Once that was done, the seminal vesicles were grasped and then retracted cephalad, and then we proceeded anteriorly and dissected the bladder neck until circumferential control was obtained.  The bladder neck was then incised.  The Reyes catheter was pulled out and the bladder neck was transected.  Next, the apical attachments of the prostate were   incised with cautery until the urethra was identified.  Urethra was then dissected  until circumferential control was obtained.  The anterior wall of the urethra was incised.  The Reyes catheter was fully removed and then the posterior urethra was   transected.  Next, the posterior apical attachments of the prostate were transected and the specimen was now free.  It was then placed into an EndoCatch bag for later retrieval.  Next, we evaluated the entire area of dissection and did not see any significant arterial bleeding or rectal injury.  Small venous bleeders were controlled with 3 mm clips.  Nextt, the vesicourethral anastomosis was performed.  We used a 3-0 V-Loc suture, which was passed outside in on the anterior bladder neck and then inside out on the anterior urethra.  We passed the two separate V-Loc sutures on each side at the 6 o'clock position on the bladder neck and urethra, and then continued the suture line  about a quarter of the anastomosis on each side was completed.  Next, the retracting silk sutures were relaxed and then the anastomosis   was completed again by running the suture circumferentially around the posterior bladder neck and the posterior urethra.  Once the anastomosis was completed, the two ends of the suture were tied and then the Reyes catheter was advanced into the   bladder and 10 mL of sterile water were placed in the balloon.  Next, the bladder was filled with 240 mL of normal saline and no evidence of saline extravasation was seen. The catheter was then removed. Once that was done, we used Vistaseal fibrin glue to fill out the area of dissection in the posterior cul-de-sac.  Next, the peritoneum of the posterior cul-de-sac was completely reapproximated with a running 3-0 V-Loc suture to exclude the preperitoneal space.     Next we performed bilateral pelvic lymph node dissection, first on the right side.  The peritoneum was incised until the common right iliac artery was identified.  We then dissected the lymph nodes surrounding   the right external iliac  artery and vein, right obturator fossa and right internal iliac artery, and submitted all of these nodes for routine pathology labeled as right pelvic lymph nodes.  We then examined the area of dissection and did not see any   evidence of bleeding or lymphatic leak.  The vessels and the obturator nerve were intact.  Once that was done, we proceeded on the left side, again the peritoneum was incised over the left common iliac artery.  The ureter was identified and preserved.    We then dissected the lymph nodes surrounding the left external iliac artery and vein, left internal iliac artery, left obturator fossa, and submitted those for routine pathology labeled as left pelvic lymph nodes.  At the completion of the dissection, we again evaluated the area and did not see any evidence of bleeding or lymphatic leak, and all of the vessels and the obturator nerve were preserved.       Once that was done, we examined the pelvis and the area of dissection, and did not see any evidence of bleeding.  Next, the robotic instruments were removed.  The robot was undocked and moved away from the table and I scrubbed in to  extract the specimen and closed the extraction incision as well as the trocar sites.  First, the string of the EndoCatch bag was passed outside the abdomen through the supraumbilical incision.  Next, the fascia of the 12 mm trocar in the left lower quadrant was closed with a 0 Vicryl suture using Kang-Mary device.  Next, all of the laparoscopic trocars were removed under direct vision and no evidence of bleeding was seen from the anterior abdominal wall.  At that point, the abdomen was desufflated.  An extraction incision was made by enlarging the supraumbilical trocar site.  The prostate was removed within the EndoCatch bag and submitted for pathology.  Next, the fascia of the extraction incision was closed with a running #1 PDS suture.  The subcutaneous tissues were closed with a running 3-0 Vicryl and  then the skin of all incisions was closed with 4-0 Monocryl using subcuticular technique.  The skin edges were then sealed with Dermabond.  At that point, the catheter was   attached to gravity drainage.  The patient was then awakened, extubated, transferred to the stretcher and then taken to the recovery room awake and in stable condition.  I was present and performed the entire procedure.  There were no intraoperative   complications.  All of the sponge, instrument and needle counts were correct at the completion of the case.        KHANG PHAM MD  8/13/24

## 2024-08-13 NOTE — ANESTHESIA PROCEDURE NOTES
Airway  Urgency: elective    Start Time: 8/13/2024 7:35 AM  Airway not difficult    General Information and Staff    Patient location during procedure: OR  Anesthesiologist: Troy Cantu MD  Resident/CRNA: Yanni Meyers CRNA  Performed: resident/CRNA   Performed by: Yanni Meyers CRNA  Authorized by: Troy Cantu MD      Indications and Patient Condition  Indications for airway management: anesthesia  Sedation level: deep  Preoxygenated: yes  Patient position: sniffing  Mask difficulty assessment: 3 - difficult mask (inadequate, unstable or two providers) +/- NMBA    Final Airway Details  Final airway type: endotracheal airway      Successful airway: ETT  Cuffed: yes   Successful intubation technique: video laryngoscopy  Facilitating devices/methods: intubating stylet  Endotracheal tube insertion site: oral  Blade: Clari  Blade size: #4  ETT size (mm): 8.0  Cormack-Lehane Classification: grade I - full view of glottis  Placement verified by: chest auscultation and capnometry   Measured from: lips  ETT to lips (cm): 21  Number of attempts at approach: 1  Number of other approaches attempted: 0  Atraumatic airway insertion

## 2024-08-13 NOTE — ANESTHESIA POSTPROCEDURE EVALUATION
Patient: Catrachito Peterson    Procedure Summary       Date: 08/13/24 Room / Location: [facility] OR [address] [place] [address] OR [facility]    Anesthesia Start: 0725 Anesthesia Stop: 1204    Procedure: Laparoscopic Radical Prostatectomy Robotic, PBLND (Abdomen) Diagnosis:       Prostate CA (CMS/HCC)      (Prostate CA (CMS/HCC) [C61])    Surgeons: Twin Singer MD Responsible Provider: Troy Cantu MD    Anesthesia Type: general ASA Status: 2            Anesthesia Type: general  PACU Vitals  8/13/2024 1156 - 8/13/2024 1256        8/13/2024  1208 8/13/2024  1215 8/13/2024  1230 8/13/2024  1245    BP: 122/89 118/65 124/73 121/68    Temp: 37.2 °C (99 °F) -- -- --    Pulse: 84 85 94 85    Resp: 18 17 16 16    SpO2: 93 % 96 % 99 % 97 %              Anesthesia Post Evaluation    Mode of pain management: IV medication  Patient location during evaluation: PACU  Patient participation: complete - patient participated  Level of consciousness: awake and alert  Cardiovascular status: acceptable  Airway Patency: patent  Respiratory status: acceptable, spontaneous ventilation, unassisted and nasal cannula  Hydration status: stable  Anesthetic complications: no  Comments: Full report to PACU RN.  Vitals stable.  Patent airway, good spontaneous respirations.

## 2024-08-13 NOTE — ANESTHESIOLOGIST PRE-PROCEDURE ATTESTATION
Pre-Procedure Patient Identification:  I am the Primary Anesthesiologist and have identified the patient on 08/13/24 at 7:10 AM.   I have confirmed the procedure(s) will be performed by the following surgeon/proceduralist Twin Singer MD.

## 2024-08-14 VITALS
HEIGHT: 71 IN | OXYGEN SATURATION: 97 % | RESPIRATION RATE: 16 BRPM | DIASTOLIC BLOOD PRESSURE: 63 MMHG | TEMPERATURE: 98 F | HEART RATE: 69 BPM | BODY MASS INDEX: 32.62 KG/M2 | WEIGHT: 233 LBS | SYSTOLIC BLOOD PRESSURE: 113 MMHG

## 2024-08-14 LAB
GLUCOSE BLD-MCNC: 119 MG/DL (ref 70–99)
GLUCOSE BLD-MCNC: 146 MG/DL (ref 70–99)
POCT TEST: ABNORMAL
POCT TEST: ABNORMAL

## 2024-08-14 PROCEDURE — 25800000 HC PHARMACY IV SOLUTIONS: Performed by: UROLOGY

## 2024-08-14 PROCEDURE — 63700000 HC SELF-ADMINISTRABLE DRUG: Performed by: UROLOGY

## 2024-08-14 PROCEDURE — 63600000 HC DRUGS/DETAIL CODE: Performed by: UROLOGY

## 2024-08-14 RX ORDER — CIPROFLOXACIN 500 MG/1
500 TABLET ORAL DAILY
Qty: 7 TABLET | Refills: 0 | Status: SHIPPED | OUTPATIENT
Start: 2024-08-14 | End: 2024-08-21

## 2024-08-14 RX ORDER — OXYCODONE HYDROCHLORIDE 5 MG/1
5 TABLET ORAL EVERY 6 HOURS PRN
Qty: 20 TABLET | Refills: 0 | Status: SHIPPED | OUTPATIENT
Start: 2024-08-14 | End: 2024-08-19

## 2024-08-14 RX ORDER — POLYETHYLENE GLYCOL 3350 17 G/17G
17 POWDER, FOR SOLUTION ORAL DAILY
Qty: 7 PACKET | Refills: 0 | Status: SHIPPED | OUTPATIENT
Start: 2024-08-14 | End: 2024-08-21

## 2024-08-14 RX ADMIN — ACETAMINOPHEN 650 MG: 325 TABLET ORAL at 08:31

## 2024-08-14 RX ADMIN — CHLORTHALIDONE 25 MG: 25 TABLET ORAL at 08:31

## 2024-08-14 RX ADMIN — KETOROLAC TROMETHAMINE 15 MG: 15 INJECTION, SOLUTION INTRAMUSCULAR; INTRAVENOUS at 00:18

## 2024-08-14 RX ADMIN — SODIUM CHLORIDE: 9 INJECTION, SOLUTION INTRAVENOUS at 04:07

## 2024-08-14 RX ADMIN — AMLODIPINE BESYLATE 10 MG: 10 TABLET ORAL at 08:31

## 2024-08-14 RX ADMIN — ACETAMINOPHEN 650 MG: 325 TABLET ORAL at 04:05

## 2024-08-14 RX ADMIN — CEFAZOLIN 2 G: 2 INJECTION, POWDER, FOR SOLUTION INTRAMUSCULAR; INTRAVENOUS at 04:05

## 2024-08-14 RX ADMIN — HEPARIN SODIUM 5000 UNITS: 5000 INJECTION, SOLUTION INTRAVENOUS; SUBCUTANEOUS at 06:10

## 2024-08-14 RX ADMIN — CIPROFLOXACIN HYDROCHLORIDE 500 MG: 500 TABLET, FILM COATED ORAL at 08:31

## 2024-08-14 RX ADMIN — KETOROLAC TROMETHAMINE 15 MG: 15 INJECTION, SOLUTION INTRAMUSCULAR; INTRAVENOUS at 06:08

## 2024-08-14 RX ADMIN — POLYETHYLENE GLYCOL 3350 17 G: 17 POWDER, FOR SOLUTION ORAL at 08:31

## 2024-08-14 RX ADMIN — ACETAMINOPHEN 650 MG: 325 TABLET ORAL at 13:15

## 2024-08-14 NOTE — DISCHARGE INSTRUCTIONS
Please call the office at [phone] to schedule appointment with Dr. Singer for an appointment to remove your suprapubic tube.    Please take antibiotics as prescribed for approximately 1 week. Tylenol should be taken for mild to moderate pain and Oxycodone for severe pain as needed. Continue stool softeners (miralax) until having regular BMs.    IMPORTANT: Please keep suprapubic catheter to DRAINAGE until 8/17. Starting 8/17, you may disconnect bag, and use the catheter plug to then plug your catheter.  You may secure the tube to your abdomen with a piece of tape to make it more comfortable when capped. You may then try to void normally through your urethra at least every 3-4 hours.  After every void, you should then uncap your suprapubic tube and measure the amount of drainage that comes out, then you may recap it. Please record each post-void residual number to bring to your follow-up appointment for Dr. Singer to review.  If the numbers are high (>300 mL) or if you feel uncomfortable with a full bladder, then place your suprapubic tube back to gravity drainage and call the office for further instruction.      DISCHARGE INSTRUCTIONS FOR ROBOTIC PROSTATECTOMY (ANKIT)     WOUND CARE:  There are 6 small incisions.  They are closed with absorbable sutures and surgical glue.  There is no need for dressings or bandages.  You may shower beginning the day after surgery.  NO swimming or tub baths until the catheter is removed.  Swelling or bruising is normal - especially around the penis or scrotum - and should resolve in 10-20 days.  Firmness or hardness under the incisions is normal; it may take several months to resolve.  The surgical soap (Chloraprep®) tints your skin orange and is designed to not wash off immediately.  It will fade away in 2-3 weeks.  The surgical glue will peel off the incisions in about 2-4 weeks.  ACTIVITY:  You should be physically active and try to do a little more each day to build  your stamina.  You may walk and climb stairs without limitations.  You may not lift more than 15 pounds, or do any vigorous physical activity for 4 weeks (running, swimming, gym, golf, tennis, etc.).  Engaging in this activity too soon may cause internal bleeding or hernia formation.  Even after 4 weeks, you may be uncomfortable with strenuous exercise or in a seated position (bicycling, horseback riding, motorcycling, etc.).  This area corresponds to where your prostate gland used to be.  DRIVING:  You should not drive for at least one week.  If, after one week, you are no longer using pain medications and returned to your baseline mobility, you should be able to drive.  PAIN:  It is normal to have a moderate amount of pain for the first 2-3 days.  After that the discomfort should lessen rapidly.  Narcotic pain killers will be prescribed for you for moderate to severe pain.  Most patients require very few narcotic pain killers, if any.  Please use Tylenol for mild pain.  MEDICATIONS:  After surgery you should immediately resume your regular medications.    You will receive 3 prescriptions:  An antibiotic for use until the catheter is removed.  A narcotic pain killer  Stool softeners (also available over-the-counter)  DIET AND BOWELS:  Eat lightly for the first 2-3 days.  Avoid carbonated beverages (sodas) for the first week as they can worsen gas pains.    Once you are hungry, you may eat what you like.  If your appetite has not returned, PLEASE don’t force yourself to eat heavily.  It is normal to have a slightly decreased appetite for the first few weeks.  Most men will not move their bowels for the first 4-5 days following surgery.  Continue taking stool softeners until your bowels are moving regularly.  Use narcotic pain medications sparingly as they can worsen constipation.  If needed, you may use oral laxatives to help your bowels get started.  You may NOT use rectal laxatives (i.e. enemas).  Milk of Magnesia  1oz. twice daily may be helpful if you have troubles moving your bowels.    CATHETER CARE:  You will be discharged with a suprapubic catheter, a leg bag and a large (overnight) drainage bag.  You should already have an appointment scheduled for catheter removal.  Always keep the bag below the level of the bladder - the catheter drains by gravity.  Always make sure the catheter is not being pulled on - we don’t want the catheter to be removed accidentally.  Blood in the urine is common and can be seen during the first 6 weeks after surgery.  If you do see blood in the urine, please drink plenty of water (to flush it out) and limit your activity slightly until the catheter clears up.  Leaking around the catheter is not unusual.  It commonly happens during a bowel movement or with bladder spasms.  Bladder spasms cause a sudden sensation that you need to urinate, crampy pain and a sudden flow of urine into and around the catheter.  Bladder spasms are uncomfortable, but harmless.  You may take the catheter and bag into the shower with you.  Any material on the outside of the catheter can be washed off - please be sure to rinse any soap off completely to avoid penile irritation.    WHEN TO CALL US:  Temperature of 101.0 degrees or above (fevers <101 may be normal after surgery).  Severe pain - not relieved by narcotic pain medication.  Heavy bleeding from, worsening redness or separation of your incisions.  Thick, red urine (like ketchup) or large clots in the urine.  No urine output into the catheter bag for greater than 2 hours.  APPOINTMENTS:  6 to 7 days postoperatively for catheter removal.   Three months postoperatively for PSA blood testing.  You will visit with Dr. Singer.  Interval appointments as needed for problems or concerns.

## 2024-08-14 NOTE — PROGRESS NOTES
"Urology Daily Progress Note    Subjective     Interval History:   No acute events overnight. Ambulated halls multiple times. Tolerated clears. Pain is controlled but having gas pains. No flatus. Urine crystal clear       Objective     Vital signs in last 24 hours:  Temp:  [36.2 °C (97.2 °F)-37.2 °C (99 °F)] 36.3 °C (97.3 °F)  Heart Rate:  [70-96] 70  Resp:  [14-18] 18  BP: (105-145)/(59-89) 105/61      Intake/Output Summary (Last 24 hours) at 8/14/2024 0627  Last data filed at 8/14/2024 0600  Gross per 24 hour   Intake 6531.67 ml   Output 2500 ml   Net 4031.67 ml     Intake/Output this shift:  I/O this shift:  In: 2431.7 [P.O.:880; I.V.:1551.7]  Out: 975 [Urine:975]    Labs  BMP Results         07/25/24     1131        K 3.6    Cl 98    CO2 32    Glucose 131    BUN 17    Creatinine 1.1    Calcium 10.1    Anion Gap 9    EGFR >60.0           Comment for EGFR at 1131 on 07/25/24: Calculation based on the Chronic Kidney Disease Epidemiology Collaboration (CKD-EPI) equation refit without adjustment for race.          CBC Results         07/25/24     1131    WBC 7.95    RBC 4.79    HGB 14.2    HCT 41.4    MCV 86.4    MCH 29.6    MCHC 34.3              UA Results    No lab values to display.       Microbiology Results       ** No results found for the last 720 hours. **              Physicial Exam  Visit Vitals  /61 (BP Location: Right upper arm, Patient Position: Lying)   Pulse 70   Temp 36.3 °C (97.3 °F) (Oral)   Resp 18   Ht 1.803 m (5' 11\")   Wt 106 kg (233 lb)   SpO2 96%   BMI 32.50 kg/m²     General appearance: alert, no acute distress   Lungs: Unlabored respirations, symmetric chest expansion   Heart: regular rate    Abdomen: soft, non-tender to palpation, non-distended   : urine crystal clear   Extremities: extremities normal, warm and well-perfused; no calve tenderness bilaterally   Neurologic: grossly normal       Assessment     63M with high risk prostate cancer s/p RALP, PLND 8/13      "   Plan     - advance to regular  - continue pain control  - dvt ppx  - miralax  - encourage ambulation  - discharge later today     [facility]  Pager [phone]  Aaron Ramos DO PGY 5    Attending: I saw and evaluated the patient.  I discussed the case with the Resident and agree with the findings and plan as documented in the note except for my comments below or within the additional notes today.    Twin Singer M.D.  Urology

## 2024-08-14 NOTE — PLAN OF CARE
Care Coordination Admission Assessment Note    General Information:  Readmission Within the last 30 days: no previous admission in last 30 days  Does patient have a : No  Patient-Specific Goals (include timeframe):      Living Arrangements:  Arrived From: home  Current Living Arrangements: home  People in Home: spouse  Home Accessibility: stairs to enter home (Group), stairs within home (Group)  Living Arrangement Comments: Pt lives with his wife in a 2SH with 1 ALY. Bed/bath on 2nd fl.    Housing Stability and Utility Access (SDOH):  In the last 12 months, was there a time when you were not able to pay the mortgage or rent on time?: No  In the last 12 months, how many places have you lived?: 1  In the last 12 months, was there a time when you did not have a steady place to sleep or slept in a shelter (including now)?: No  In the past 12 months has the electric, gas, oil, or water company threatened to shut off services in your home?: No    Functional Status Prior to Admission:   Assistive Device/Animal Currently Used at Home: none  Functional Status Comments: Pt uses no AD to ambulate at baseline.  IADL Comments: IND with ADLs and IADLs.     Supports and Services:  Current Outpatient/Agency/Support Group: none  Type of Current Home Care Services:    History of home care episode or rehab stay: No hx of HH/SNF/ARH    Discharge Needs Assessment:   Concerns to be Addressed: denies needs/concerns at this time, no discharge needs identified  Current Discharge Risk:    Anticipated Changes Related to Illness: none    Patient/Family Anticipated Discharge Plan:  Patient/Family Anticipates Transition To: home with family  Patient/Family Anticipated Services at Transition: none    Connection to Community  Not applicable    Patient Choice:   Offered/Gave Vendor List: no  Patient's Choice of Community Agency(s):         Anticipated Discharge Plan:  Met with patient. Provided education and contact information for  Care Coordination services.: yes  Anticipated Discharge Disposition: home with assistance     Transportation Needs (SDOH):  Transportation Concerns: none  Transportation Anticipated: family or friend will provide  Is Out of Hospital DNR needed at discharge?: no    In the past 12 months, has lack of transportation kept you from medical appointments or from getting medications?: No  In the past 12 months, has lack of transportation kept you from meetings, work, or from getting things needed for daily living?: No    Concerns - comments: Pt denies needs for home. Declined HH.     Pt S/P prostatectomy/bilateral pelvic lymph node dissection. Sw met with pt OOB this morning. Pt confirmed demo, PCP, pharm (JONNIE Welsh), insurance, and emergency contacts. Pts wife will transport home.     IMM reviewed.     Anticipated DC Plans  Home with assist  Family will transport

## 2024-08-14 NOTE — PLAN OF CARE
Care Coordination Discharge Plan Summary    Admission Assessment Summary    General Information  Readmission Within the last 30 days: no previous admission in last 30 days  Does patient have a : No  Patient-Specific Goals (include timeframe):      Living Arrangements  Arrived From: home  Current Living Arrangements: home  People in Home: spouse  Home Accessibility: stairs to enter home (Group), stairs within home (Group)  Living Arrangement Comments: Pt lives with his wife in a 2SH with 1 ALY. Bed/bath on 2nd fl.    Social Determinants of Health - Screenings  Housing Stability  In the last 12 months, was there a time when you were not able to pay the mortgage or rent on time?: No  In the last 12 months, how many places have you lived?: 1  In the last 12 months, was there a time when you did not have a steady place to sleep or slept in a shelter (including now)?: No  Utility Access  In the past 12 months has the electric, gas, oil, or water company threatened to shut off services in your home?: No  Transportation Needs  In the past 12 months, has lack of transportation kept you from medical appointments or from getting medications?: No  In the past 12 months, has lack of transportation kept you from meetings, work, or from getting things needed for daily living?: No    Functional Status Prior to Admission  Assistive Device/Animal Currently Used at Home: none  Functional Status Comments: Pt uses no AD to ambulate at baseline.  IADL Comments: IND with ADLs and IADLs.    Discharge Needs Assessment    Concerns to be Addressed: denies needs/concerns at this time, no discharge needs identified  Current Discharge Risk:    Anticipated Changes Related to Illness: none    Discharge Plan Summary    Patient Choice  Offered/Gave Vendor List: no       Concerns / Comments: Pt to DC to home today. No DC needs identified. Fam to transport.    Discharge Plan:  Disposition/Destination: Home  / Home       Connection to  Community  Not applicable  Community Resources:      Discharge Transportation:  Is Out of Hospital DNR needed at Discharge: no  Does patient need discharge transport?  No, fam to transport    DC Plans  Home with assist  Family will transport

## 2024-08-16 LAB
CASE RPRT: NORMAL
CLINICAL INFO: NORMAL
PATH REPORT.FINAL DX SPEC: NORMAL
PATH REPORT.GROSS SPEC: NORMAL
PATHOLOGY SYNOPTIC REPORT: NORMAL

## 2024-09-26 ENCOUNTER — ESTABLISHED COMPREHENSIVE EXAM (OUTPATIENT)
Dept: URBAN - METROPOLITAN AREA CLINIC 48 | Facility: CLINIC | Age: 64
End: 2024-09-26

## 2024-09-26 DIAGNOSIS — Z97.3: ICD-10-CM

## 2024-09-26 DIAGNOSIS — H52.13: ICD-10-CM

## 2024-09-26 DIAGNOSIS — E11.9: ICD-10-CM

## 2024-09-26 PROCEDURE — 92310 CONTACT LENS FITTING OU: CPT

## 2024-09-26 PROCEDURE — MISCOPTOS MISCELLANEOUS, OPTOS

## 2024-09-26 PROCEDURE — 92015 DETERMINE REFRACTIVE STATE: CPT

## 2024-09-26 PROCEDURE — 99213 OFFICE O/P EST LOW 20 MIN: CPT

## 2024-09-26 ASSESSMENT — VISUAL ACUITY
OD_CC: 20/25-1
OD_SC: 20/20
OS_SC: 20/300
OS_SC: 20/20
OS_CC: 20/20
OD_SC: 20/300

## 2024-09-26 ASSESSMENT — TONOMETRY
OS_IOP_MMHG: 11
OD_IOP_MMHG: 15

## (undated) DEVICE — SHEARS TIP COVER DAVINCI ONE USE

## (undated) DEVICE — Device

## (undated) DEVICE — HEMOSTAT SURGICEL SNOW ABSORB 4 X 4

## (undated) DEVICE — PORT ACCESS 12MM W/ BLADELESS OPTICAL TIP 120MM LONG

## (undated) DEVICE — TROCAR BLADELESS OBTURATOR

## (undated) DEVICE — SUTURE VLOC 3-0 90 UNDYED 1X6 V-20

## (undated) DEVICE — COVER TABLE 60X90 ST 22/CS

## (undated) DEVICE — GOWN SIRUS FABRNF RAGLAN XL ST 28/CS

## (undated) DEVICE — PASSER SUTURE OMNICLOSE W/PILOT GUIDE 14GAU

## (undated) DEVICE — SUTURE MAXON 1 GREEN 1X36 GS-21

## (undated) DEVICE — APPLICATOR CHLORAPREP 26ML ORANGE TINT

## (undated) DEVICE — BLADE SCALPEL #11

## (undated) DEVICE — SEAL UNIVERSAL XI/X

## (undated) DEVICE — OINTMENT SURGILUBE 4OZ TUBE

## (undated) DEVICE — SKIN MARKER SURGICAL

## (undated) DEVICE — GLOVE SZ 8 LINER PROTEXIS PI BL

## (undated) DEVICE — TUBING STRYKEFLOW SUCT/IRRIG 10IN

## (undated) DEVICE — NEEDLE/VERRES 120MM    PN120

## (undated) DEVICE — SOLN IV 0.9% NSS 1000ML

## (undated) DEVICE — ADHESIVE SKIN DERMABOND ADVANCED 0.7ML

## (undated) DEVICE — DRAPE ORTHOMAX  BAR 6/CS

## (undated) DEVICE — PAD GROUND ELECTROSURGICAL W/CORD

## (undated) DEVICE — SPECIMN RETV DVC CATCH10 RELIACATCH 10MM

## (undated) DEVICE — TISSEEL PRIMA RTU KIT 10ML

## (undated) DEVICE — INTRODUCER SUPRAPUBIC CATHETER 16FR SHEATH 20FR L17CM TROCAR

## (undated) DEVICE — GLOVE SZ 7.5 PROTEXIS PI

## (undated) DEVICE — SET AIRSEAL FILTERED TUBE SET

## (undated) DEVICE — SOLN IRRIG STER WATER 1000ML

## (undated) DEVICE — PAD POSITIONING XL

## (undated) DEVICE — SUTURE ETHILON 2-0   585H

## (undated) DEVICE — FORCEPS BIPOLAR FENESTRATED XI REPOSABLE

## (undated) DEVICE — TUBING SMOKE EVAC PENCIL COATED

## (undated) DEVICE — APPLICATOR TISSEEL SPRAY TUBING

## (undated) DEVICE — SUTURE STRATAFIX 3-0 15CM PDS PS-1 VIOLET

## (undated) DEVICE — DRAPE ARM DAVINCI XI

## (undated) DEVICE — DRESSING TELFA 3X8

## (undated) DEVICE — FORCEPS BIPOLAR MARYLAND XI REPOSABLE

## (undated) DEVICE — APPLIER CLIP MED-LRG XI REPOSABLE

## (undated) DEVICE — DRAPE 3/4 REINFORCED

## (undated) DEVICE — CLIPS HEMOLOK ML

## (undated) DEVICE — TIPS SCISSOR MICROLINE LAP

## (undated) DEVICE — TROCAR XCEL BLADELESS 5MM X 100MM LONG

## (undated) DEVICE — PACK RFID ROBOTIC PROSTATECTOMY

## (undated) DEVICE — SUTURE VLOC 3-0 90 VIOLET 1X9 V-20

## (undated) DEVICE — SHEARS CRVD HOT XI  REPOSABLE

## (undated) DEVICE — FORCEPS PROGRASP XI REPOSABLE

## (undated) DEVICE — COVER LIGHTHANDLE (STERILE SINGLE PA

## (undated) DEVICE — BAG URINARY DRAINAGE

## (undated) DEVICE — SUTURE SURGIPRO 2-0 BLUE 1X30 SC-2

## (undated) DEVICE — SYSTEM VISUALIZATION CLEARIFY

## (undated) DEVICE — DRIVER NEEDLE LRG XI REPOSABLE

## (undated) DEVICE — DRAPE IOBAN

## (undated) DEVICE — SLEEVE SCD COMFORT KNEE LENGTH MED

## (undated) DEVICE — TOWEL SURGICAL W17XL27IN BLUE COTTON STANDARD PREWASHED DELI

## (undated) DEVICE — DRAPE COLUMN DAVINCI XI